# Patient Record
Sex: MALE | Race: WHITE | NOT HISPANIC OR LATINO | URBAN - METROPOLITAN AREA
[De-identification: names, ages, dates, MRNs, and addresses within clinical notes are randomized per-mention and may not be internally consistent; named-entity substitution may affect disease eponyms.]

---

## 2018-07-03 ENCOUNTER — OFFICE VISIT (OUTPATIENT)
Dept: URBAN - METROPOLITAN AREA CLINIC 23 | Facility: CLINIC | Age: 78
End: 2018-07-03
Payer: MEDICARE

## 2018-07-03 DIAGNOSIS — H33.012 RETINAL DETACHMENT WITH SINGLE BREAK, LEFT EYE: Primary | ICD-10-CM

## 2018-07-03 PROCEDURE — 99213 OFFICE O/P EST LOW 20 MIN: CPT | Performed by: OPHTHALMOLOGY

## 2018-07-03 PROCEDURE — 92134 CPTRZ OPH DX IMG PST SGM RTA: CPT | Performed by: OPHTHALMOLOGY

## 2018-07-03 ASSESSMENT — INTRAOCULAR PRESSURE
OD: 18
OS: 15

## 2018-07-03 NOTE — IMPRESSION/PLAN
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 Code: U72.593. Vision: vision affected. Condition: established, stable/treated. s/p Cecil OS 05/31/2017.
s/p PCA OS 10/21/2015, PPVX for Repair of RD OS w/Gas 01/07/2016, PPVX for Repair of Recurrent RD w/Perfl OS 02/10/2016, PPVX for removal of Perf and ERM OS 06/01/2016, PPVX for Repair of RD OS w/Gas 07/27/2016. Plan: Discussed diagnosis in detail with patient. Exam does not show any inflammation in eyes, exam also shows the retina is stable with no ERM or CME OS. Explained to patient that the blurry vision may be associated with RICHAR, recommend to increase artificial Gel tears OU QID - 6 x's a day more often than regular artificial tears and use Refresh PM at night. Continue Prolensa QD OS, and decrease Durezol to QD OS.

## 2018-11-08 ENCOUNTER — OFFICE VISIT (OUTPATIENT)
Dept: URBAN - METROPOLITAN AREA CLINIC 23 | Facility: CLINIC | Age: 78
End: 2018-11-08
Payer: MEDICARE

## 2018-11-08 PROCEDURE — 99213 OFFICE O/P EST LOW 20 MIN: CPT | Performed by: OPHTHALMOLOGY

## 2018-11-08 PROCEDURE — 92134 CPTRZ OPH DX IMG PST SGM RTA: CPT | Performed by: OPHTHALMOLOGY

## 2018-11-08 ASSESSMENT — INTRAOCULAR PRESSURE
OS: 17
OD: 18

## 2018-11-08 NOTE — IMPRESSION/PLAN
Impression: Cystoid macular degeneration, left eye: H35.352. OS. Condition: worsing Vision:affected s/p Kenalog OS 05/31/2017
s/p PCA OS 10/21/2015, PPVX for Repair of RD OS w/Gas 01/07/2016, PPVX for Repair of Recurrent RD w/Perfl OS 02/10/2016, PPVX for removal of Perf and ERM OS 06/01/2016, PPVX for Repair of RD OS w/Gas 07/27/2016. Plan: Discussed diagnosis in detail with patient. Discussed risks of progression. Based on exam and OCT recommend Ozurdex implant in order to help reduce the swelling and prevent a further reduction in vision. Discussed the risks and benefits of tx including the possibility of increase in IOP. All questions answered. Patient elects to proceed with recommendation. RL1. OCT shows increased CME OS Continue using Durezol QD OS and Prolensa QD OS

## 2018-11-27 ENCOUNTER — SURGERY (OUTPATIENT)
Dept: URBAN - METROPOLITAN AREA SURGERY 11 | Facility: SURGERY | Age: 78
End: 2018-11-27
Payer: MEDICARE

## 2018-11-27 PROCEDURE — 67027 IMPLANT EYE DRUG SYSTEM: CPT | Performed by: OPHTHALMOLOGY

## 2018-11-28 ENCOUNTER — POST-OPERATIVE VISIT (OUTPATIENT)
Dept: URBAN - METROPOLITAN AREA CLINIC 23 | Facility: CLINIC | Age: 78
End: 2018-11-28

## 2018-11-28 DIAGNOSIS — Z09 ENCNTR FOR F/U EXAM AFT TRTMT FOR COND OTH THAN MALIG NEOPLM: Primary | ICD-10-CM

## 2018-11-28 ASSESSMENT — INTRAOCULAR PRESSURE
OD: 14
OS: 14

## 2018-12-06 ENCOUNTER — POST-OPERATIVE VISIT (OUTPATIENT)
Dept: URBAN - METROPOLITAN AREA CLINIC 23 | Facility: CLINIC | Age: 78
End: 2018-12-06

## 2018-12-06 PROCEDURE — 99024 POSTOP FOLLOW-UP VISIT: CPT | Performed by: OPHTHALMOLOGY

## 2018-12-06 ASSESSMENT — INTRAOCULAR PRESSURE
OD: 19
OS: 17

## 2019-01-15 ENCOUNTER — POST-OPERATIVE VISIT (OUTPATIENT)
Dept: URBAN - METROPOLITAN AREA CLINIC 23 | Facility: CLINIC | Age: 79
End: 2019-01-15

## 2019-01-15 PROCEDURE — 99024 POSTOP FOLLOW-UP VISIT: CPT | Performed by: OPHTHALMOLOGY

## 2019-01-15 ASSESSMENT — INTRAOCULAR PRESSURE
OS: 20
OD: 17

## 2019-02-20 ENCOUNTER — PROCEDURE (OUTPATIENT)
Dept: URBAN - METROPOLITAN AREA CLINIC 23 | Facility: CLINIC | Age: 79
End: 2019-02-20

## 2019-02-20 DIAGNOSIS — Z02.4 ENCOUNTER FOR EXAMINATION FOR DRIVING LICENSE: Primary | ICD-10-CM

## 2019-02-20 PROCEDURE — 92083 EXTENDED VISUAL FIELD XM: CPT | Performed by: OPTOMETRIST

## 2019-02-20 ASSESSMENT — KERATOMETRY
OS: 43.38
OD: 43.38

## 2019-02-20 ASSESSMENT — INTRAOCULAR PRESSURE
OS: 20
OD: 19

## 2019-02-20 NOTE — IMPRESSION/PLAN
Impression: Encounter for examination for driving license: B07.9. Plan: VF done. Pt passed. DMV forms filled and signed.

## 2019-02-28 ENCOUNTER — OFFICE VISIT (OUTPATIENT)
Dept: URBAN - METROPOLITAN AREA CLINIC 23 | Facility: CLINIC | Age: 79
End: 2019-02-28
Payer: MEDICARE

## 2019-02-28 PROCEDURE — 92134 CPTRZ OPH DX IMG PST SGM RTA: CPT | Performed by: OPHTHALMOLOGY

## 2019-02-28 PROCEDURE — 99213 OFFICE O/P EST LOW 20 MIN: CPT | Performed by: OPHTHALMOLOGY

## 2019-02-28 ASSESSMENT — INTRAOCULAR PRESSURE
OD: 20
OS: 20

## 2019-02-28 NOTE — IMPRESSION/PLAN
Impression: Cystoid macular degeneration, left eye: H35.352. OS. Condition: improving Vision: improving S/P Ozurdex OS 11/27/18
s/p Kenalog OS 05/31/2017
s/p PCA OS 10/21/2015, PPVX for Repair of RD OS w/Gas 01/07/2016, PPVX for Repair of Recurrent RD w/Perfl OS 02/10/2016, PPVX for removal of Perf and ERM OS 06/01/2016, PPVX for Repair of RD OS w/Gas 07/27/2016. Plan: Advised patient of condition. Discussed diagnosis in detail with patient. No treatment is required at this time. Will continue to observe condition and or symptoms. Call if [address] worsens. reassess in 2 months OCT shows decreased edema OS Exam and OCT of the right eye are stable today.

## 2019-05-29 ENCOUNTER — OFFICE VISIT (OUTPATIENT)
Dept: URBAN - METROPOLITAN AREA CLINIC 23 | Facility: CLINIC | Age: 79
End: 2019-05-29
Payer: MEDICARE

## 2019-05-29 PROCEDURE — 92134 CPTRZ OPH DX IMG PST SGM RTA: CPT | Performed by: OPHTHALMOLOGY

## 2019-05-29 PROCEDURE — 99213 OFFICE O/P EST LOW 20 MIN: CPT | Performed by: OPHTHALMOLOGY

## 2019-05-29 ASSESSMENT — INTRAOCULAR PRESSURE
OD: 17
OS: 16

## 2019-05-29 NOTE — IMPRESSION/PLAN
Impression: Cystoid macular degeneration, left eye: H35.352. OS. Condition: stabilizing. Vision: decreasing / affected. S/P Ozurdex OS 11/27/2018
s/p Kenalog OS 05/31/2017
s/p PCA OS 10/21/2015, PPVX for Repair of RD OS w/Gas 01/07/2016, PPVX for Repair of Recurrent RD w/Perfl OS 02/10/2016, PPVX for removal of Perf and ERM OS 06/01/2016, PPVX for Repair of RD OS w/Gas 07/27/2016. Plan: Discussed diagnosis in detail with patient. Exam OS shows minimal edema and OCT OS shows minimal increase CMT OS. Patient was hospitalized recently but felling better. Recommend no treatment at this time. , will reassess condition in 2 mos.

## 2019-07-30 ENCOUNTER — OFFICE VISIT (OUTPATIENT)
Dept: URBAN - METROPOLITAN AREA CLINIC 23 | Facility: CLINIC | Age: 79
End: 2019-07-30
Payer: MEDICARE

## 2019-07-30 DIAGNOSIS — H04.123 DRY EYE SYNDROME OF BILATERAL LACRIMAL GLANDS: ICD-10-CM

## 2019-07-30 PROCEDURE — 92134 CPTRZ OPH DX IMG PST SGM RTA: CPT | Performed by: OPHTHALMOLOGY

## 2019-07-30 PROCEDURE — 99213 OFFICE O/P EST LOW 20 MIN: CPT | Performed by: OPHTHALMOLOGY

## 2019-07-30 ASSESSMENT — INTRAOCULAR PRESSURE
OS: 16
OD: 18

## 2019-07-30 NOTE — IMPRESSION/PLAN
Impression: Cystoid macular degeneration, left eye: H35.352. OS. Condition: stabilizing. Vision: stable, affected. S/P Ozurdex OS 11/27/2018
s/p Kenalog OS 05/31/2017
s/p PCA OS 10/21/2015, PPVX for Repair of RD OS w/Gas 01/07/2016, PPVX for Repair of Recurrent RD w/Perfl OS 02/10/2016, PPVX for removal of Perf and ERM OS 06/01/2016, PPVX for Repair of RD OS w/Gas 07/27/2016. Plan: Exam and OCT today of the left eye shows stable no change in swelling. Discussed diagnosis in detail with patient. Advised patient of condition. No treatment is required. Recommend close observation and will reassess in 10 wks. Exam and OCT of the right eye show a stable macula. No treatment required.

## 2019-07-30 NOTE — IMPRESSION/PLAN
Impression: Dry eye syndrome of bilateral lacrimal glands: H04.123. OU. Plan: Dry eyes account for the patient's complaints. There is no evidence of permanent changes to the cornea. Explained condition does not have a cure and will need GEL artificial tears for maintenance.

## 2019-09-30 ENCOUNTER — OFFICE VISIT (OUTPATIENT)
Dept: URBAN - METROPOLITAN AREA CLINIC 23 | Facility: CLINIC | Age: 79
End: 2019-09-30
Payer: MEDICARE

## 2019-09-30 PROCEDURE — 92134 CPTRZ OPH DX IMG PST SGM RTA: CPT | Performed by: OPHTHALMOLOGY

## 2019-09-30 PROCEDURE — 99213 OFFICE O/P EST LOW 20 MIN: CPT | Performed by: OPHTHALMOLOGY

## 2019-09-30 RX ORDER — BROMFENAC SODIUM 0.7 MG/ML
0.07 % SOLUTION/ DROPS OPHTHALMIC
Qty: 3 | Refills: 10 | Status: INACTIVE
Start: 2019-09-30 | End: 2019-10-01

## 2019-09-30 ASSESSMENT — INTRAOCULAR PRESSURE
OS: 18
OD: 21

## 2019-09-30 NOTE — IMPRESSION/PLAN
Impression: Cystoid macular degeneration, left eye: H35.352. OS. Condition: stabilizing. Vision: stable, affected. S/P Ozurdex OS 11/27/2018
s/p Kenalog OS 05/31/2017
s/p PCA OS 10/21/2015, PPVX for Repair of RD OS w/Gas 01/07/2016, PPVX for Repair of Recurrent RD w/Perfl OS 02/10/2016, PPVX for removal of Perf and ERM OS 06/01/2016, PPVX for Repair of RD OS w/Gas 07/27/2016. Plan: Discussed diagnosis in detail with patient. Exam OS shows mild edema and OCT OS shows an increase in CMT. Recommend to start Prolensa OU QD to help reduce inflammation. Exam also shows some corneal changes OS. Recommend a corneal consult.

## 2019-11-06 ENCOUNTER — OFFICE VISIT (OUTPATIENT)
Dept: URBAN - METROPOLITAN AREA CLINIC 23 | Facility: CLINIC | Age: 79
End: 2019-11-06
Payer: MEDICARE

## 2019-11-06 DIAGNOSIS — H35.352 CYSTOID MACULAR DEGENERATION, LEFT EYE: ICD-10-CM

## 2019-11-06 DIAGNOSIS — H16.113 MACULAR KERATITIS, BILATERAL: ICD-10-CM

## 2019-11-06 PROCEDURE — 99213 OFFICE O/P EST LOW 20 MIN: CPT | Performed by: OPHTHALMOLOGY

## 2019-11-06 ASSESSMENT — INTRAOCULAR PRESSURE
OS: 18
OD: 16

## 2019-11-06 NOTE — IMPRESSION/PLAN
Impression: Dry eye syndrome of bilateral lacrimal glands: H04.123. Condition: quality of life issue. Vision: vision affected. Plan: Discussed diagnosis in detail with patient. Discussed treatment options with patient. Discussed risks of progression. Patient instructed to use artificial tears as needed. Recommend BLL punctal plugs. R/B/A discussed and understood by patient and patient elects to proceed as indicated. Recommend Humidifier and avoid fans.

## 2019-11-06 NOTE — IMPRESSION/PLAN
Impression: Cystoid macular degeneration, left eye: H35.352. OS. Condition: stabilizing. Vision: stable, affected. S/P Ozurdex OS 11/27/2018
s/p Kenalog OS 05/31/2017
s/p PCA OS 10/21/2015, PPVX for Repair of RD OS w/Gas 01/07/2016, PPVX for Repair of Recurrent RD w/Perfl OS 02/10/2016, PPVX for removal of Perf and ERM OS 06/01/2016, PPVX for Repair of RD OS w/Gas 07/27/2016.  Plan: Continue gtts and F/U with Dr Cipriano Farias as scheduled

## 2020-01-24 ENCOUNTER — OFFICE VISIT (OUTPATIENT)
Dept: URBAN - METROPOLITAN AREA CLINIC 23 | Facility: CLINIC | Age: 80
End: 2020-01-24
Payer: MEDICARE

## 2020-01-24 PROCEDURE — 92134 CPTRZ OPH DX IMG PST SGM RTA: CPT | Performed by: OPHTHALMOLOGY

## 2020-01-24 PROCEDURE — 99213 OFFICE O/P EST LOW 20 MIN: CPT | Performed by: OPHTHALMOLOGY

## 2020-01-24 RX ORDER — BROMFENAC SODIUM 0.7 MG/ML
0.07 % SOLUTION/ DROPS OPHTHALMIC
Qty: 3 | Refills: 11 | Status: INACTIVE
Start: 2020-01-24 | End: 2021-03-30

## 2020-01-24 ASSESSMENT — INTRAOCULAR PRESSURE
OD: 21
OS: 14

## 2020-01-24 NOTE — IMPRESSION/PLAN
Impression: Cystoid macular degeneration, left eye: H35.352. OS. Condition: stabilizing. Vision: stable, affected. S/P Ozurdex OS 11/27/2018
s/p Kenalog OS 05/31/2017
s/p PCA OS 10/21/2015, PPVX for Repair of RD OS w/Gas 01/07/2016, PPVX for Repair of Recurrent RD w/Perfl OS 02/10/2016, PPVX for removal of Perf and ERM OS 06/01/2016, PPVX for Repair of RD OS w/Gas 07/27/2016. Plan: Discussed diagnosis in detail with patient. Exam OS shows minimal edema and OCT OS shows a decrease in CMT. Recommend for patient to stop Ketorolac due to irritation and burning, and start Prolensa qd OS to further reduce inflammation. Also advised patient to use artificial tear, gel 4-6x daily. Will reassess in 5 months. [facility]

## 2020-06-24 ENCOUNTER — OFFICE VISIT (OUTPATIENT)
Dept: URBAN - METROPOLITAN AREA CLINIC 23 | Facility: CLINIC | Age: 80
End: 2020-06-24
Payer: MEDICARE

## 2020-06-24 PROCEDURE — 99213 OFFICE O/P EST LOW 20 MIN: CPT | Performed by: OPHTHALMOLOGY

## 2020-06-24 ASSESSMENT — INTRAOCULAR PRESSURE
OD: 16
OS: 14

## 2020-06-24 NOTE — IMPRESSION/PLAN
Impression: Cystoid macular degeneration, left eye: H35.352. OS. Condition: stabilizing. Vision: stable, affected. S/P Ozurdex OS 11/27/2018
s/p Kenalog OS 05/31/2017
s/p PCA OS 10/21/2015, PPVX for Repair of RD OS w/Gas 01/07/2016, PPVX for Repair of Recurrent RD w/Perfl OS 02/10/2016, PPVX for removal of Perf and ERM OS 06/01/2016, PPVX for Repair of RD OS w/Gas 07/27/2016. Plan: Due to Coronavirus COVID-19 pandemic and National Emergency, deferred Slit Lamp examination. Findings are based on OCT and Optos. OCT shows decreased CMT OS    and Optos shows stable no active leakage OS. Continue Prolensa qd OS to further reduce inflammation. Also advised patient to use artificial tear, gel 4-6x daily. Recommend a retina follow - up in 6 mos. Recommend patient download computer program Flux to reduce glare on his computer.

## 2020-12-15 ENCOUNTER — OFFICE VISIT (OUTPATIENT)
Dept: URBAN - METROPOLITAN AREA CLINIC 23 | Facility: CLINIC | Age: 80
End: 2020-12-15
Payer: MEDICARE

## 2020-12-15 PROCEDURE — 99213 OFFICE O/P EST LOW 20 MIN: CPT | Performed by: OPHTHALMOLOGY

## 2020-12-15 PROCEDURE — 92134 CPTRZ OPH DX IMG PST SGM RTA: CPT | Performed by: OPHTHALMOLOGY

## 2020-12-15 ASSESSMENT — INTRAOCULAR PRESSURE
OS: 16
OD: 17

## 2020-12-15 NOTE — IMPRESSION/PLAN
Impression: Cystoid macular degeneration, left eye: H35.352. OS. Condition: stabilizing. Vision: stable, affected. S/P Ozurdex OS 11/27/2018
s/p Kenalog OS 05/31/2017
s/p PCA OS 10/21/2015, PPVX for Repair of RD OS w/Gas 01/07/2016, PPVX for Repair of Recurrent RD w/Perfl OS 02/10/2016, PPVX for removal of Perf and ERM OS 06/01/2016, PPVX for Repair of RD OS w/Gas 07/27/2016. Plan: Due to Coronavirus COVID-19 pandemic and National Emergency, deferred Slit Lamp examination. Findings are based on OCT and Optos. OCT shows decreased CMT OS    and Optos shows stable no active leakage OS. Continue Prolensa qd OS to further reduce inflammation. Also advised patient to use artificial tear, gel 4-6x daily. Recommend a retina follow - up in 6 mos.

## 2021-06-23 ENCOUNTER — OFFICE VISIT (OUTPATIENT)
Dept: URBAN - METROPOLITAN AREA CLINIC 23 | Facility: CLINIC | Age: 81
End: 2021-06-23
Payer: MEDICARE

## 2021-06-23 PROCEDURE — 99213 OFFICE O/P EST LOW 20 MIN: CPT | Performed by: OPHTHALMOLOGY

## 2021-06-23 PROCEDURE — 92134 CPTRZ OPH DX IMG PST SGM RTA: CPT | Performed by: OPHTHALMOLOGY

## 2021-06-23 ASSESSMENT — INTRAOCULAR PRESSURE
OS: 13
OD: 20

## 2021-06-23 NOTE — IMPRESSION/PLAN
Impression: Cystoid macular degeneration, left eye: H35.352. OS. Condition: stabilizing. Vision: stable, affected. S/P Ozurdex OS 11/27/2018
s/p Kenalog OS 05/31/2017
s/p PCA OS 10/21/2015, PPVX for Repair of RD OS w/Gas 01/07/2016, PPVX for Repair of Recurrent RD w/Perfl OS 02/10/2016, PPVX for removal of Perf and ERM OS 06/01/2016, PPVX for Repair of RD OS w/Gas 07/27/2016. Plan: Discussed diagnosis in detail with patient. Discussed risks of progression. Based on today's exam, diagnostic studies and review of records, recommend to continue with Prolensa QD OS in order to help further reduce the inflammation. Also advised patient to use artificial tear, gel 4-6x daily. Recommend a retina follow - up in 6 mos. OCT shows mild edema hazy view and Optos shows stable no active leakage

## 2022-01-21 ENCOUNTER — OFFICE VISIT (OUTPATIENT)
Dept: URBAN - METROPOLITAN AREA CLINIC 23 | Facility: CLINIC | Age: 82
End: 2022-01-21
Payer: MEDICARE

## 2022-01-21 PROCEDURE — 92134 CPTRZ OPH DX IMG PST SGM RTA: CPT | Performed by: OPHTHALMOLOGY

## 2022-01-21 PROCEDURE — 99213 OFFICE O/P EST LOW 20 MIN: CPT | Performed by: OPHTHALMOLOGY

## 2022-01-21 ASSESSMENT — INTRAOCULAR PRESSURE
OD: 20
OS: 16

## 2022-01-21 NOTE — IMPRESSION/PLAN
Impression: Cystoid macular degeneration, left eye: H35.352. OS. Condition: improving. Vision: stable, affected. S/P Ozurdex OS 11/27/2018
s/p Kenalog OS 05/31/2017
s/p PCA OS 10/21/2015, PPVX for Repair of RD OS w/Gas 01/07/2016, PPVX for Repair of Recurrent RD w/Perfl OS 02/10/2016, PPVX for removal of Perf and ERM OS 06/01/2016, PPVX for Repair of RD OS w/Gas 07/27/2016. Plan: Due to Coronavirus COVID-19 pandemic and National Emergency, deferred Slit Lamp examination. Findings are based on diagnostic tests. OCT shows image is sharper with decrease in edema OS and Optos shows a decrease in edema. Recommend to continue using Prolensa OS QD. Will reassess the retina in 4 mos, sooner if there is a change or decrease in vision. Patient states his allergies are bad and is affecting his eyes. Advise Prolensa helps with allergies.

## 2022-05-09 ENCOUNTER — OFFICE VISIT (OUTPATIENT)
Dept: URBAN - METROPOLITAN AREA CLINIC 23 | Facility: CLINIC | Age: 82
End: 2022-05-09
Payer: MEDICARE

## 2022-05-09 DIAGNOSIS — H35.352 CYSTOID MACULAR DEGENERATION, LEFT EYE: Primary | ICD-10-CM

## 2022-05-09 PROCEDURE — 92134 CPTRZ OPH DX IMG PST SGM RTA: CPT | Performed by: OPHTHALMOLOGY

## 2022-05-09 PROCEDURE — 99213 OFFICE O/P EST LOW 20 MIN: CPT | Performed by: OPHTHALMOLOGY

## 2022-05-09 PROCEDURE — 92250 FUNDUS PHOTOGRAPHY W/I&R: CPT | Performed by: OPHTHALMOLOGY

## 2022-05-09 RX ORDER — PREDNISOLONE ACETATE 10 MG/ML
1 % SUSPENSION/ DROPS OPHTHALMIC
Qty: 10 | Refills: 5 | Status: ACTIVE
Start: 2022-05-09

## 2022-05-09 ASSESSMENT — INTRAOCULAR PRESSURE
OD: 20
OS: 13

## 2022-05-09 NOTE — IMPRESSION/PLAN
Impression: Cystoid macular degeneration, left eye: H35.352. OS. Condition: improving. Vision: stable, affected. S/P Ozurdex OS 11/27/2018
s/p Kenalog OS 05/31/2017
s/p PCA OS 10/21/2015, PPVX for Repair of RD OS w/Gas 01/07/2016, PPVX for Repair of Recurrent RD w/Perfl OS 02/10/2016, PPVX for removal of Perf and ERM OS 06/01/2016, PPVX for Repair of RD OS w/Gas 07/27/2016. Plan: Discussed diagnosis in detail with patient. Exam OS shows mild corneal haze and decrease in macular edema. Recommend to continue using Prolensa OS QD and start PF OS BID to help the cornea (wrote Rx for QID pt is to use med BID OS). Will reassess condition in 4 mos, sooner if there is change or decrease in vision. OCT and Optos OS shows a decrease in edema.

## 2022-09-08 ENCOUNTER — OFFICE VISIT (OUTPATIENT)
Dept: URBAN - METROPOLITAN AREA CLINIC 23 | Facility: CLINIC | Age: 82
End: 2022-09-08
Payer: MEDICARE

## 2022-09-08 DIAGNOSIS — H35.352 CYSTOID MACULAR DEGENERATION, LEFT EYE: Primary | ICD-10-CM

## 2022-09-08 PROCEDURE — 92134 CPTRZ OPH DX IMG PST SGM RTA: CPT | Performed by: OPHTHALMOLOGY

## 2022-09-08 PROCEDURE — 99213 OFFICE O/P EST LOW 20 MIN: CPT | Performed by: OPHTHALMOLOGY

## 2022-09-08 ASSESSMENT — INTRAOCULAR PRESSURE
OS: 16
OD: 26

## 2022-09-08 NOTE — IMPRESSION/PLAN
Impression: Cystoid macular degeneration, left eye: H35.352. OS. Condition: improving. Vision: stable, affected. S/P Ozurdex OS 11/27/2018
s/p Kenalog OS 05/31/2017
s/p PCA OS 10/21/2015, PPVX for Repair of RD OS w/Gas 01/07/2016, PPVX for Repair of Recurrent RD w/Perfl OS 02/10/2016, PPVX for removal of Perf and ERM OS 06/01/2016, PPVX for Repair of RD OS w/Gas 07/27/2016. Plan: Discussed diagnosis in detail with patient. Exam OS shows persistent macular edema. Recommend to continue using Prolensa OS QD and  PF OS BID  Will reassess condition in 6 mos, sooner if there is change or decrease in vision. OCT and Optos OS shows persistent edema. Recommend for patient to use artificial tear ointment / gel for comfort.